# Patient Record
Sex: MALE | Race: WHITE | URBAN - METROPOLITAN AREA
[De-identification: names, ages, dates, MRNs, and addresses within clinical notes are randomized per-mention and may not be internally consistent; named-entity substitution may affect disease eponyms.]

---

## 2023-03-24 ENCOUNTER — OFFICE VISIT (OUTPATIENT)
Dept: URBAN - METROPOLITAN AREA CLINIC 64 | Facility: CLINIC | Age: 48
End: 2023-03-24
Payer: COMMERCIAL

## 2023-03-24 DIAGNOSIS — H04.123 TEAR FILM INSUFFICIENCY OF BILATERAL LACRIMAL GLANDS: ICD-10-CM

## 2023-03-24 DIAGNOSIS — H11.053 PERIPHERAL PTERYGIUM, STATIONARY, BILATERAL: Primary | ICD-10-CM

## 2023-03-24 PROCEDURE — 99204 OFFICE O/P NEW MOD 45 MIN: CPT | Performed by: OPTOMETRIST

## 2023-03-24 ASSESSMENT — KERATOMETRY
OS: 42.53
OD: 42.40

## 2023-03-24 ASSESSMENT — INTRAOCULAR PRESSURE
OD: 18
OS: 19

## 2023-03-24 NOTE — IMPRESSION/PLAN
Impression: Tear film insufficiency of bilateral lacrimal glands: H04.123. Plan: Patient's complaint is consistent with dry eye syndrome. Discussed limiting air when driving in the car. There is no evidence of permanent changes to the cornea. Explained there are ways to improve the symptoms but no permanent fix for the symptoms. Reviewed options including topical treatments, punctal plugs and permanent punctal occlusion. ATs sample given.

## 2023-03-24 NOTE — IMPRESSION/PLAN
Impression: Peripheral pterygium, stationary, bilateral: H11.053. Plan: Discussed diagnosis in detail with patient. Discussed treatment options with patient. Consult recommended with Dr. Feliberto Borrego. Recommend ATs PRN and wearing sunglasses for UV protection.

## 2023-03-30 ENCOUNTER — OFFICE VISIT (OUTPATIENT)
Dept: URBAN - METROPOLITAN AREA CLINIC 64 | Facility: CLINIC | Age: 48
End: 2023-03-30
Payer: COMMERCIAL

## 2023-03-30 DIAGNOSIS — H25.813 COMBINED FORMS OF AGE-RELATED CATARACT, BILATERAL: ICD-10-CM

## 2023-03-30 DIAGNOSIS — H11.053 PERIPHERAL PTERYGIUM, STATIONARY, BILATERAL: Primary | ICD-10-CM

## 2023-03-30 PROCEDURE — 92002 INTRM OPH EXAM NEW PATIENT: CPT | Performed by: STUDENT IN AN ORGANIZED HEALTH CARE EDUCATION/TRAINING PROGRAM

## 2023-03-30 ASSESSMENT — INTRAOCULAR PRESSURE
OS: 17
OD: 18

## 2023-03-30 NOTE — IMPRESSION/PLAN
Impression: Peripheral pterygium, stationary, bilateral: H11.053. Plan: Discussed diagnosis and treatment options with patient. Reviewed risks/benefits/alternatives to surgical removal including possible change in refraction, risk of recurrence, mitomycin C, and corneal scarring/risk of corneal melt. Patient wishes to proceed with surgery.  
Schedule pterygium removal OD then OS
-Patient to stay out of work for at least one week after each surgery

## 2023-04-05 ENCOUNTER — ADULT PHYSICAL (OUTPATIENT)
Dept: URBAN - METROPOLITAN AREA CLINIC 64 | Facility: CLINIC | Age: 48
End: 2023-04-05
Payer: COMMERCIAL

## 2023-04-05 DIAGNOSIS — H11.053 PERIPHERAL PTERYGIUM, PROGRESSIVE, BILATERAL: ICD-10-CM

## 2023-04-05 DIAGNOSIS — Z01.818 ENCOUNTER FOR OTHER PREPROCEDURAL EXAMINATION: Primary | ICD-10-CM

## 2023-04-05 PROCEDURE — 99203 OFFICE O/P NEW LOW 30 MIN: CPT | Performed by: NURSE PRACTITIONER

## 2023-04-12 ENCOUNTER — SURGERY (OUTPATIENT)
Dept: URBAN - METROPOLITAN AREA SURGERY 42 | Facility: SURGERY | Age: 48
End: 2023-04-12
Payer: COMMERCIAL

## 2023-04-12 PROCEDURE — 65426 REMOVAL OF EYE LESION: CPT | Performed by: STUDENT IN AN ORGANIZED HEALTH CARE EDUCATION/TRAINING PROGRAM

## 2023-04-19 ENCOUNTER — POST-OPERATIVE VISIT (OUTPATIENT)
Dept: URBAN - METROPOLITAN AREA CLINIC 64 | Facility: CLINIC | Age: 48
End: 2023-04-19
Payer: COMMERCIAL

## 2023-04-19 DIAGNOSIS — Z48.89 ENCOUNTER FOR OTHER SPECIFIED SURGICAL AFTERCARE: Primary | ICD-10-CM

## 2023-04-19 DIAGNOSIS — Z48.810 ENCOUNTER FOR SURGICAL AFTERCARE FOLLOWING SURGERY ON A SENSE ORGAN: ICD-10-CM

## 2023-04-19 PROCEDURE — 99024 POSTOP FOLLOW-UP VISIT: CPT | Performed by: OPTOMETRIST

## 2023-04-19 ASSESSMENT — INTRAOCULAR PRESSURE
OS: 16
OD: 18

## 2023-04-19 NOTE — IMPRESSION/PLAN
Impression: S/P Pterygium Excision with Graft OD - 7 Days. Encounter for other specified surgical aftercare  Z48.89. Plan: Patient doing well, happy with vision. Okay for second eye surgery.

## 2023-04-19 NOTE — IMPRESSION/PLAN
Impression: S/P Pterygium Excision with Graft OD - 7 Days. Encounter for surgical aftercare following surgery on a sense organ  Z48.810. Plan: Patient doing well, happy with vision. Okay for second eye surgery.

## 2023-04-26 ENCOUNTER — SURGERY (OUTPATIENT)
Dept: URBAN - METROPOLITAN AREA SURGERY 42 | Facility: SURGERY | Age: 48
End: 2023-04-26
Payer: COMMERCIAL

## 2023-04-26 PROCEDURE — 65426 REMOVAL OF EYE LESION: CPT | Performed by: STUDENT IN AN ORGANIZED HEALTH CARE EDUCATION/TRAINING PROGRAM

## 2023-04-26 RX ORDER — NEOMYCIN SULFATE, POLYMYXIN B SULFATE AND DEXAMETHASONE 3.5; 10000; 1 MG/G; [USP'U]/G; MG/G
OINTMENT OPHTHALMIC
Qty: 5 | Refills: 1 | Status: ACTIVE
Start: 2023-04-26

## 2023-05-03 ENCOUNTER — POST-OPERATIVE VISIT (OUTPATIENT)
Dept: URBAN - METROPOLITAN AREA CLINIC 64 | Facility: CLINIC | Age: 48
End: 2023-05-03
Payer: COMMERCIAL

## 2023-05-03 DIAGNOSIS — Z48.810 ENCOUNTER FOR SURGICAL AFTERCARE FOLLOWING SURGERY ON A SENSE ORGAN: Primary | ICD-10-CM

## 2023-05-03 PROCEDURE — 99024 POSTOP FOLLOW-UP VISIT: CPT

## 2023-05-03 ASSESSMENT — KERATOMETRY
OD: 41.98
OS: 42.38

## 2023-05-03 NOTE — IMPRESSION/PLAN
Impression: S/P Pterygium Excision with Graft OS - 7 Days. Encounter for surgical aftercare following surgery on a sense organ  Z48.810. Plan: Pt educated. Pt is doing well after surgery. RTC in 3 weeks.

## 2023-05-23 ENCOUNTER — POST-OPERATIVE VISIT (OUTPATIENT)
Dept: URBAN - METROPOLITAN AREA CLINIC 64 | Facility: CLINIC | Age: 48
End: 2023-05-23
Payer: COMMERCIAL

## 2023-05-23 DIAGNOSIS — Z48.810 ENCOUNTER FOR SURGICAL AFTERCARE FOLLOWING SURGERY ON A SENSE ORGAN: Primary | ICD-10-CM

## 2023-05-23 PROCEDURE — 99024 POSTOP FOLLOW-UP VISIT: CPT

## 2023-05-23 NOTE — IMPRESSION/PLAN
Impression: S/P Pterygium Excision with Graft OS - 27 Days. Encounter for surgical aftercare following surgery on a sense organ  Z48.810. Plan: okay to discontinue tricia, continue AT's daily. RTC 1yr.